# Patient Record
Sex: MALE | Race: WHITE | Employment: FULL TIME | URBAN - METROPOLITAN AREA
[De-identification: names, ages, dates, MRNs, and addresses within clinical notes are randomized per-mention and may not be internally consistent; named-entity substitution may affect disease eponyms.]

---

## 2018-03-23 ENCOUNTER — HOSPITAL ENCOUNTER (OUTPATIENT)
Dept: MEDSURG UNIT | Age: 53
Discharge: HOME | End: 2018-03-23
Payer: COMMERCIAL

## 2018-03-23 PROCEDURE — C1751 CATH, INF, PER/CENT/MIDLINE: HCPCS

## 2018-03-23 PROCEDURE — 77030018786 HC NDL GD F/USND BARD -B

## 2018-03-23 PROCEDURE — 76937 US GUIDE VASCULAR ACCESS: CPT

## 2018-03-23 PROCEDURE — 36569 INSJ PICC 5 YR+ W/O IMAGING: CPT | Performed by: INTERNAL MEDICINE

## 2018-03-23 PROCEDURE — 77030018719 HC DRSG PTCH ANTIMIC J&J -A

## 2018-03-23 NOTE — PROGRESS NOTES
PICC Placement Note    PRE-PROCEDURE VERIFICATION  Correct Procedure: yes. Time out completed with assistant Jenn Parra RN, VAT and all persons present in agreement with time out. Correct Site:  yes  Temperature:  , Temperature Source:    No results for input(s): BUN, CREA, PLT, INR, WBC, PLTEXT in the last 72 hours. No lab exists for component: APTHR, INREXT  Allergies: Review of patient's allergies indicates not on file. Education materials for [facility] given to patient or family. PROCEDURE DETAIL  A double lumen PICC line was started for antibiotic therapy. The following documentation is in addition to the PICC properties in the lines/airways flowsheet :  Lot #: [id number]  xylocaine used: yes  Mid-Arm Circumference: 24 (cm)  Internal Catheter Length: 38 (cm)  Internal Catheter Total Length: 38 (cm)  Vein Selection for PICC:right brachial  Central Line Bundle followed yes  Complication Related to Insertion: unable to advance the PICC on the first stick in the right brachial (in the right arm). Moved to the left brachial (in the right arm) and accessed the vein and advanced the catheter without problem. Both the insertion guidewire and ECG guidewire were removed intact all ports have positive blood return and were flush well with normal saline. The location of the tip of the PICC is verified using ECG technology. The tip is in the SVC per ECG reading. See image below.      Line is okay to use: yes    Marino Rubin RN     Right arm, right brachial vein          Right arm, left brachial vein